# Patient Record
Sex: FEMALE | Race: OTHER | NOT HISPANIC OR LATINO
[De-identification: names, ages, dates, MRNs, and addresses within clinical notes are randomized per-mention and may not be internally consistent; named-entity substitution may affect disease eponyms.]

---

## 2019-01-29 PROBLEM — Z00.00 ENCOUNTER FOR PREVENTIVE HEALTH EXAMINATION: Status: ACTIVE | Noted: 2019-01-29

## 2019-02-05 ENCOUNTER — APPOINTMENT (OUTPATIENT)
Dept: SPEECH THERAPY | Facility: CLINIC | Age: 49
End: 2019-02-05
Payer: MEDICAID

## 2019-02-05 PROCEDURE — G9173: CPT | Mod: CI

## 2019-02-05 PROCEDURE — G9171: CPT | Mod: CK

## 2019-02-05 PROCEDURE — 92524 BEHAVRAL QUALIT ANALYS VOICE: CPT

## 2019-02-05 PROCEDURE — G9172: CPT | Mod: CJ

## 2019-02-05 PROCEDURE — 92520 LARYNGEAL FUNCTION STUDIES: CPT

## 2019-03-06 ENCOUNTER — APPOINTMENT (OUTPATIENT)
Dept: SPEECH THERAPY | Facility: CLINIC | Age: 49
End: 2019-03-06
Payer: MEDICAID

## 2019-03-06 PROCEDURE — 92507 TX SP LANG VOICE COMM INDIV: CPT | Mod: GN

## 2019-03-13 ENCOUNTER — APPOINTMENT (OUTPATIENT)
Dept: SPEECH THERAPY | Facility: CLINIC | Age: 49
End: 2019-03-13

## 2019-03-27 ENCOUNTER — APPOINTMENT (OUTPATIENT)
Dept: SPEECH THERAPY | Facility: CLINIC | Age: 49
End: 2019-03-27

## 2019-06-20 ENCOUNTER — EMERGENCY (EMERGENCY)
Facility: HOSPITAL | Age: 49
LOS: 1 days | Discharge: HOME | End: 2019-06-20
Attending: EMERGENCY MEDICINE | Admitting: EMERGENCY MEDICINE
Payer: MEDICAID

## 2019-06-20 VITALS
TEMPERATURE: 98 F | SYSTOLIC BLOOD PRESSURE: 151 MMHG | DIASTOLIC BLOOD PRESSURE: 96 MMHG | RESPIRATION RATE: 18 BRPM | OXYGEN SATURATION: 100 % | HEART RATE: 96 BPM

## 2019-06-20 PROCEDURE — 99283 EMERGENCY DEPT VISIT LOW MDM: CPT
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
## 2019-06-20 NOTE — ED ADULT NURSE NOTE - CHPI ED NUR SYMPTOMS POS
Last 5 Patient Entered Readings                                      Current 30 Day Average: 131/86     Recent Readings 11/25/2018 11/25/2018 11/24/2018 11/24/2018 11/21/2018    SBP (mmHg) 141 141 123 123 136    DBP (mmHg) 92 92 78 78 88    Pulse 86 86 91 91 77        Digital Medicine: Health  Follow Up    Feeling well.    Increased HCTZ, no side effects of note. Plans to get blood work done to check K+ as requested.    Lifestyle Modifications:    1.Dietary Modifications: need for improved dietary habits over the holidays. Encouraged low sodium.    2.Physical Activity: cleaning house boats, walking    3.Medication Therapy: Patient has been compliant with the medication regimen.    4.Patient has the following medication side effects/concerns:   (Frequency/Alleviating factors/Precipitating factors, etc.)     Follow up with Mrs. Vijaya Montenegro completed. No further questions or concerns. Will continue to follow up to achieve health goals.    
PAIN

## 2019-06-20 NOTE — ED ADULT NURSE NOTE - CHPI ED NUR SYMPTOMS NEG
no disorientation/no abdominal distension/no abdominal pain/no vomiting/no fever/no chills/no confusion/no nausea/no weakness
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
## 2019-06-20 NOTE — ED PROVIDER NOTE - PHYSICAL EXAMINATION
HDS, NAD, MMM, eyes clear, lungs CTAB, heart sounds normal, abd soft, NT, ND, no CVAT, LEs without edema, wwp, skin with 3x1cm area of erythema to back of R hand, mild erythema to top of forehead, neuro: alert and oriented, no focal deficits
